# Patient Record
Sex: FEMALE | Race: WHITE | NOT HISPANIC OR LATINO | Employment: OTHER | URBAN - METROPOLITAN AREA
[De-identification: names, ages, dates, MRNs, and addresses within clinical notes are randomized per-mention and may not be internally consistent; named-entity substitution may affect disease eponyms.]

---

## 2017-08-25 ENCOUNTER — OFFICE VISIT (OUTPATIENT)
Dept: OBSTETRICS AND GYNECOLOGY | Age: 57
End: 2017-08-25

## 2017-08-25 VITALS
BODY MASS INDEX: 28.92 KG/M2 | DIASTOLIC BLOOD PRESSURE: 76 MMHG | SYSTOLIC BLOOD PRESSURE: 116 MMHG | HEIGHT: 63 IN | WEIGHT: 163.2 LBS

## 2017-08-25 DIAGNOSIS — Z13.9 SCREENING: Primary | ICD-10-CM

## 2017-08-25 DIAGNOSIS — N89.8 VAGINAL LESION: ICD-10-CM

## 2017-08-25 DIAGNOSIS — B37.2 CUTANEOUS CANDIDIASIS: ICD-10-CM

## 2017-08-25 LAB
BILIRUB BLD-MCNC: ABNORMAL MG/DL
CLARITY, POC: CLEAR
COLOR UR: YELLOW
GLUCOSE UR STRIP-MCNC: NEGATIVE MG/DL
KETONES UR QL: ABNORMAL
LEUKOCYTE EST, POC: ABNORMAL
NITRITE UR-MCNC: NEGATIVE MG/ML
PH UR: 5.5 [PH] (ref 5–8)
PROT UR STRIP-MCNC: NEGATIVE MG/DL
RBC # UR STRIP: ABNORMAL /UL
SP GR UR: 1.03 (ref 1–1.03)
UROBILINOGEN UR QL: NORMAL

## 2017-08-25 PROCEDURE — 99213 OFFICE O/P EST LOW 20 MIN: CPT | Performed by: PHYSICIAN ASSISTANT

## 2017-08-25 PROCEDURE — 81002 URINALYSIS NONAUTO W/O SCOPE: CPT | Performed by: PHYSICIAN ASSISTANT

## 2017-08-25 RX ORDER — ASCORBIC ACID 1000 MG
TABLET ORAL
COMMUNITY
End: 2020-12-28

## 2017-08-25 RX ORDER — CLOTRIMAZOLE AND BETAMETHASONE DIPROPIONATE 10; .64 MG/G; MG/G
CREAM TOPICAL 2 TIMES DAILY
Qty: 1 EACH | Refills: 1 | Status: SHIPPED | OUTPATIENT
Start: 2017-08-25 | End: 2017-09-19

## 2017-08-25 RX ORDER — MULTIPLE VITAMINS W/ MINERALS TAB 9MG-400MCG
1 TAB ORAL DAILY
COMMUNITY

## 2017-08-25 RX ORDER — CALCIUM CARBONATE 200(500)MG
1 TABLET,CHEWABLE ORAL 2 TIMES DAILY
COMMUNITY

## 2017-08-25 RX ORDER — WARFARIN SODIUM 5 MG/1
TABLET ORAL
COMMUNITY
Start: 2017-06-21 | End: 2017-09-19

## 2017-08-25 RX ORDER — TRAZODONE HYDROCHLORIDE 50 MG/1
TABLET ORAL
COMMUNITY
Start: 2017-08-04 | End: 2020-12-28

## 2017-08-25 RX ORDER — ASCORBIC ACID 500 MG
500 TABLET ORAL DAILY
COMMUNITY
End: 2018-09-21

## 2017-08-25 RX ORDER — NICOTINE 14MG/24HR
PATCH, TRANSDERMAL 24 HOURS TRANSDERMAL
COMMUNITY

## 2017-08-25 RX ORDER — BIOTIN 1000 MCG
5000 TABLET,CHEWABLE ORAL
COMMUNITY
End: 2020-03-03

## 2017-08-25 RX ORDER — TAMSULOSIN HYDROCHLORIDE 0.4 MG/1
1 CAPSULE ORAL NIGHTLY
COMMUNITY
End: 2018-09-21

## 2017-08-25 RX ORDER — FUROSEMIDE 20 MG/1
20 TABLET ORAL DAILY
COMMUNITY
End: 2020-12-28

## 2017-08-25 RX ORDER — WARFARIN SODIUM 2.5 MG/1
TABLET ORAL
COMMUNITY
Start: 2017-08-02 | End: 2018-09-21

## 2017-08-25 RX ORDER — CHLORAL HYDRATE 500 MG
CAPSULE ORAL 2 TIMES DAILY WITH MEALS
COMMUNITY
End: 2022-03-14 | Stop reason: SDUPTHER

## 2017-08-25 NOTE — PROGRESS NOTES
"Subjective     Chief Complaint   Patient presents with   • Follow-up     Pt states vaginal bumps- thinks she has a possible yeast infection? denies vaginal discharge. Pt states has irritation in the left side of groin that comes and goes for the past 3-4 weeks.        Nevin Marei is a 57 y.o. No obstetric history on file. whose LMP is No LMP recorded. Patient has had a hysterectomy. presents with possible yeast infection on the left side of her groin and also a bump on her labia majora. She has a h/o stage 4 endometriosis and had Hyst b/c of that.     Pt had an aortic valve replacement on June 16th (had a bicuspid valve), she is in cardiac rehab now and has been more active because of this.  It was a rough recovery for her especially the first 4 weeks.  She had some lung issues that have since resolved.       She is also scheduled for lithotripsy but they are waiting for her to fully recover from the heart surgery    She is pretty complex medically. Diabetes, h/o endometriosis, hypothyroid, asthma, allergies.  BS are relatively stable.       for 22 years    She denies risk of STD exposure.        No Additional Complaints Reported    The following portions of the patient's history were reviewed and updated as appropriate:vital signs, allergies, current medications, past family history, past medical history, past social history, past surgical history and problem list      Review of Systems   A comprehensive review of systems was negative except for: Integument/breast: positive for pruritus and skin lesion(s)  Labial bump on the left side     Objective      /76  Ht 62.75\" (159.4 cm)  Wt 163 lb 3.2 oz (74 kg)  Breastfeeding? No  BMI 29.14 kg/m2    Physical Exam    General:   alert and comfortable   Heart: Not performed today   Lungs: Not performed today.   Breast: Not performed today   Neck: na   Abdomen: {Not performed today   CVA: Not performed today   Pelvis: External genitalia: erythema in " inguinal canal on the right side  Vaginal: see pic, atrophic changes noted vaginally   Extremities: Not performed today   Neurologic: negative   Psychiatric: Normal affect, judgement, and mood       Lab Review   Labs: Urinalysis - with micro     Imaging   No data reviewed    Assessment/Plan     ASSESSMENT  1. Screening    2. Cutaneous candidiasis    3. Vaginal lesion        PLAN  1.   Orders Placed This Encounter   Procedures   • POC Urinalysis Dipstick       2. Medications prescribed this encounter:        New Medications Ordered This Visit   Medications   • warfarin (COUMADIN) 2.5 MG tablet   • warfarin (COUMADIN) 5 MG tablet   • traZODone (DESYREL) 50 MG tablet   • furosemide (LASIX) 20 MG tablet     Sig: Take 20 mg by mouth Daily.   • vitamin C (ASCORBIC ACID) 500 MG tablet     Sig: Take 500 mg by mouth Daily.   • Multiple Vitamins-Minerals (MULTIVITAMIN WITH MINERALS) tablet tablet     Sig: Take 1 tablet by mouth Daily.   • calcium carbonate (TUMS) 500 MG chewable tablet     Sig: Chew 1 tablet 2 (Two) Times a Day.   • Biotin 1000 MCG chewable tablet     Sig: Chew 5,000 mg.   • Omega-3 Fatty Acids (FISH OIL) 1000 MG capsule capsule     Sig: Take  by mouth 2 (Two) Times a Day With Meals.   • Saccharomyces boulardii (PROBIOTIC) 250 MG capsule     Sig: Take  by mouth.   • Jovita, Zingiber officinalis, (JOVITA ROOT) 500 MG capsule     Sig: Take  by mouth.   • vitamin E 100 UNIT capsule     Sig: Take 100 Units by mouth Daily.   • tamsulosin (FLOMAX) 0.4 MG capsule 24 hr capsule     Sig: Take 1 capsule by mouth Every Night.   • Mirabegron ER (MYRBETRIQ) 50 MG tablet sustained-release 24 hour 24 hr tablet     Sig: Take 50 mg by mouth Daily.   • clotrimazole-betamethasone (LOTRISONE) 1-0.05 % cream     Sig: Apply  topically 2 (Two) Times a Day.     Dispense:  1 each     Refill:  1       3. R/o herpes.  Discussed possibility and encouraged to avoid IC for now.  However she has been with the same partner for 22 years so   Likely that if this is herpes, he has already been exposed.     Follow up: 4 week(s) for annual    REINA Coronel  8/25/2017

## 2017-09-01 LAB
A VAGINAE DNA VAG QL NAA+PROBE: ABNORMAL SCORE
BVAB2 DNA VAG QL NAA+PROBE: ABNORMAL SCORE
C ALBICANS DNA VAG QL NAA+PROBE: POSITIVE
C GLABRATA DNA VAG QL NAA+PROBE: NEGATIVE
HSV1 DNA SPEC QL NAA+PROBE: NEGATIVE
HSV2 DNA SPEC QL NAA+PROBE: NEGATIVE
MEGA1 DNA VAG QL NAA+PROBE: ABNORMAL SCORE
T VAGINALIS RRNA SPEC QL NAA+PROBE: NEGATIVE

## 2017-09-01 RX ORDER — FLUCONAZOLE 150 MG/1
150 TABLET ORAL ONCE
Qty: 2 TABLET | Refills: 0 | Status: SHIPPED | OUTPATIENT
Start: 2017-09-01 | End: 2017-09-01

## 2017-09-07 ENCOUNTER — TELEPHONE (OUTPATIENT)
Dept: OBSTETRICS AND GYNECOLOGY | Age: 57
End: 2017-09-07

## 2017-09-07 NOTE — TELEPHONE ENCOUNTER
Dr MILDRED benavidez states she was recently seen for external yeast inf and Rx'd an ointment. Woke up this a.m with blood in the toilet and mild cramping low abd, no burning, no urg, no freq, no low back pain. Please advise

## 2017-09-08 ENCOUNTER — OFFICE VISIT (OUTPATIENT)
Dept: OBSTETRICS AND GYNECOLOGY | Age: 57
End: 2017-09-08

## 2017-09-08 VITALS
WEIGHT: 161 LBS | SYSTOLIC BLOOD PRESSURE: 110 MMHG | HEIGHT: 63 IN | BODY MASS INDEX: 28.53 KG/M2 | DIASTOLIC BLOOD PRESSURE: 72 MMHG

## 2017-09-08 DIAGNOSIS — B37.9 CANDIDIASIS: ICD-10-CM

## 2017-09-08 DIAGNOSIS — R31.9 BLOOD IN URINE: Primary | ICD-10-CM

## 2017-09-08 DIAGNOSIS — N76.1 SUBACUTE VAGINITIS: ICD-10-CM

## 2017-09-08 LAB
BILIRUB BLD-MCNC: NEGATIVE MG/DL
CLARITY, POC: CLEAR
COLOR UR: YELLOW
GLUCOSE UR STRIP-MCNC: NEGATIVE MG/DL
KETONES UR QL: NEGATIVE
LEUKOCYTE EST, POC: ABNORMAL
NITRITE UR-MCNC: NEGATIVE MG/ML
PH UR: 5.5 [PH] (ref 5–8)
PROT UR STRIP-MCNC: NEGATIVE MG/DL
RBC # UR STRIP: ABNORMAL /UL
SP GR UR: 1.01 (ref 1–1.03)
UROBILINOGEN UR QL: NORMAL

## 2017-09-08 PROCEDURE — 99213 OFFICE O/P EST LOW 20 MIN: CPT | Performed by: NURSE PRACTITIONER

## 2017-09-08 PROCEDURE — 81002 URINALYSIS NONAUTO W/O SCOPE: CPT | Performed by: NURSE PRACTITIONER

## 2017-09-08 RX ORDER — OXYBUTYNIN CHLORIDE 10 MG/1
TABLET, EXTENDED RELEASE ORAL
COMMUNITY
Start: 2017-08-31 | End: 2018-09-21

## 2017-09-08 RX ORDER — SIMVASTATIN 40 MG
TABLET ORAL
COMMUNITY
Start: 2017-08-28

## 2017-09-08 NOTE — PROGRESS NOTES
"Subjective   Nevin Marie is a 57 y.o. female is being seen today for   Chief Complaint   Patient presents with   • Vaginal Irritation     Pt c/o blood in urine, lower abdominal cramping, burning on labia and slight odor. Denies burning with urination, discharge or itching.   .    History of Present Illness     Patient was last seen in our office last week by the PA with some external vaginal irritation and red bumps. She is a new patient to me. She thought it was a yeast infection and was given lotrisone cream.  She used the cream, which helped.  At the same time a vaginal culture was obtained and she was + for yeast.  She thought something would be called in but she never received confirmation from the pharmacy that anything was ready for her.  She hasn't had itching but does have some vaginal burning.  The external rash has cleared up.  She also noticed some blood in her urine yesterday.  Of note, she does currently have kidney stones.  [facility] and they had considered lithotripsy but decided against it since she was awaiting heart surgery when they were found.  SHe denies any back pain, flank pain or other urinary symptoms.  She did see urology last week but they never mentioned if she had blood in her urine or not.    The following portions of the patient's history were reviewed and updated as appropriate: allergies, current medications, past family history, past medical history, past social history, past surgical history and problem list.    /72  Ht 62.75\" (159.4 cm)  Wt 161 lb (73 kg)  BMI 28.75 kg/m2      Review of Systems   Constitutional: Negative.    HENT: Negative.    Eyes: Negative.    Respiratory: Negative.    Cardiovascular: Negative.    Gastrointestinal: Negative.    Endocrine: Negative.    Genitourinary: Positive for vaginal pain. Negative for difficulty urinating, dysuria and genital sores.   Musculoskeletal: Negative.    Skin: Negative.    Allergic/Immunologic: Negative.  "   Neurological: Negative.    Hematological: Negative.    Psychiatric/Behavioral: Negative.        Objective   Physical Exam   Constitutional: She is oriented to person, place, and time. She appears well-developed and well-nourished.   Abdominal: Soft.   Genitourinary: Vagina normal.   Genitourinary Comments: Scant white discharge noted. No lesions or rashes noted.  There are a few red bumps on her bikini line noted.      Neurological: She is alert and oriented to person, place, and time.   Skin: Skin is warm and dry.   Psychiatric: She has a normal mood and affect.         Assessment/Plan   Nevin was seen today for vaginal irritation.    Diagnoses and all orders for this visit:    Blood in urine  -     POC Urinalysis Dipstick    Subacute vaginitis    Candidiasis    Other orders  -     terconazole (TERAZOL 7) 0.4 % vaginal cream; Insert 1 applicator into the vagina Every Night.      She will continue lotrisone externally as needed and start Terazol 7 cream vaginally x 7 days.  Patient also plans follow up with urology regarding blood in urine.

## 2017-09-19 ENCOUNTER — OFFICE VISIT (OUTPATIENT)
Dept: OBSTETRICS AND GYNECOLOGY | Age: 57
End: 2017-09-19

## 2017-09-19 VITALS
SYSTOLIC BLOOD PRESSURE: 138 MMHG | WEIGHT: 163 LBS | DIASTOLIC BLOOD PRESSURE: 90 MMHG | BODY MASS INDEX: 28.88 KG/M2 | HEIGHT: 63 IN

## 2017-09-19 DIAGNOSIS — Z01.419 WELL WOMAN EXAM WITH ROUTINE GYNECOLOGICAL EXAM: Primary | ICD-10-CM

## 2017-09-19 DIAGNOSIS — R23.2 HOT FLASHES: ICD-10-CM

## 2017-09-19 LAB
BILIRUB BLD-MCNC: NEGATIVE MG/DL
CLARITY, POC: CLEAR
COLOR UR: YELLOW
GLUCOSE UR STRIP-MCNC: NEGATIVE MG/DL
KETONES UR QL: NEGATIVE
LEUKOCYTE EST, POC: NEGATIVE
NITRITE UR-MCNC: NEGATIVE MG/ML
PH UR: 5.5 [PH] (ref 5–8)
PROT UR STRIP-MCNC: NEGATIVE MG/DL
RBC # UR STRIP: ABNORMAL /UL
SP GR UR: 1 (ref 1–1.03)
UROBILINOGEN UR QL: NORMAL

## 2017-09-19 PROCEDURE — 81002 URINALYSIS NONAUTO W/O SCOPE: CPT | Performed by: PHYSICIAN ASSISTANT

## 2017-09-19 PROCEDURE — 99396 PREV VISIT EST AGE 40-64: CPT | Performed by: PHYSICIAN ASSISTANT

## 2017-09-19 NOTE — PROGRESS NOTES
Subjective     Chief Complaint   Patient presents with   • Gynecologic Exam     annual exam., wants to talk about restarting with hrt patch       History of Present Illness    Nevin Marie is a 57 y.o.  who presents for annual exam.      She fell yesterday and had to be seen at the ER.  She is on coumadin and is now bruised on her left breast and right knee. She is therapeutic with her levels, 2.5 yesterday.  She is on it now since she had a surgery to repair a  bicuspid valve.      She is noting occasional hot flashes.  She came off her patch earlier this year when she was told she had a possible PE.  Apparently that was r/o. However, she is diabetic, has hypothyroidism and is on coumadin for recent blood clot.  She did have recent labs done and is f/u with her PCP next week. She has checked her sugars when the hot flashes occur and they have been wnl.    She saw me a few months ago for a yeast infection.  No problems since    She has tested negative for the BRCA gene.        Obstetric History:  OB History      Para Term  AB Living    0 0 0 0 0 0    SAB TAB Ectopic Multiple Live Births    0 0 0 0          Menstrual History:     No LMP recorded. Patient has had a hysterectomy.         Current contraception: status post hysterectomy  History of abnormal Pap smear: no  Received Gardasil immunization: no  Perform regular self breast exam: yes - occl  Family history of uterine or ovarian cancer: no  Family History of colon cancer: yes - PGF, maternal uncle with b cell lymphoma  Family history of breast cancer: yes - sister with Inflammatory breast cancer    Mammogram: done today.  Colonoscopy: up to date. Due in 2018  DEXA: not indicated.    Exercise: moderately active  Calcium/Vitamin D: adequate intake    The following portions of the patient's history were reviewed and updated as appropriate: allergies, current medications, past family history, past medical history, past social history, past  "surgical history and problem list.    Review of Systems   Endocrine: Positive for heat intolerance.   Skin:        Bruising     Hematological: Bruises/bleeds easily.   All other systems reviewed and are negative.      Review of Systems   Constitutional: Negative for fatigue.   Respiratory: Negative for shortness of breath.    Gastrointestinal: Negative for abdominal pain.   Genitourinary: Negative for dysuria.   Neurological: Negative for headaches.   Psychiatric/Behavioral: Negative for dysphoric mood.         Objective   Physical Exam    /90  Ht 63\" (160 cm)  Wt 163 lb (73.9 kg)  Breastfeeding? No  BMI 28.87 kg/m2    General:   alert, appears stated age and cooperative   Neck: no adenopathy and thyroid normal to palpation   Heart: regular rate and rhythm   Lungs: clear to auscultation bilaterally   Abdomen: soft and nontender   Breast: inspection negative, no nipple discharge or bleeding, no masses or nodularity palpable and small amount of bruising noted on left breast   Vulva: normal   Vagina: atrophic changes   Cervix: absent   Uterus: absent   Adnexa: absent   Rectal: normal rectal, no masses and guaiac negative stool obtained     Assessment/Plan   Nevin was seen today for gynecologic exam.    Diagnoses and all orders for this visit:    Well woman exam with routine gynecological exam  -     POC Urinalysis Dipstick, Multipro    Hot flashes        All questions answered.  Breast self exam technique reviewed and patient encouraged to perform self-exam monthly.  Discussed healthy lifestyle modifications.  Recommended 30 minutes of aerobic exercise five times per week.  Discussed calcium needs to prevent osteoporosis.    Disc pap guidelines, no longer needed  Mammo today  Disc HRT, not recommended in light of her coumadin use, recent heart surgery, length of time since MP.  Disc alternate options, including Br[facility]elle and advised pt that I would be happy to seek a second opinion on this with Dr Kc as " well. In meantime, will await results of her thyroid test

## 2017-11-13 ENCOUNTER — OFFICE VISIT (OUTPATIENT)
Dept: OBSTETRICS AND GYNECOLOGY | Age: 57
End: 2017-11-13

## 2017-11-13 VITALS
HEIGHT: 64 IN | SYSTOLIC BLOOD PRESSURE: 110 MMHG | WEIGHT: 161 LBS | DIASTOLIC BLOOD PRESSURE: 70 MMHG | BODY MASS INDEX: 27.49 KG/M2

## 2017-11-13 DIAGNOSIS — N76.3 SUBACUTE VULVITIS: ICD-10-CM

## 2017-11-13 DIAGNOSIS — L73.2 HIDRADENITIS SUPPURATIVA: Primary | ICD-10-CM

## 2017-11-13 PROCEDURE — 99213 OFFICE O/P EST LOW 20 MIN: CPT | Performed by: OBSTETRICS & GYNECOLOGY

## 2017-11-13 NOTE — PROGRESS NOTES
"GYN Visit    2017    Patient: Nevin Marie          MR#:[id number]      Chief Complaint   Patient presents with   • Gynecologic Exam     PT HERE FOR OUTBREAK OF \"BUMPS\" ON LABIA.       History of Present Illness    57 y.o. female  who presents for bumps on labia  Healing right now but 2 recent \"outbreaks\" of bumps that are firm and hurt like a pimple,   She has also had in axilla and on breasts  Just started after she started working out more on her cardiac rehab  She is on warfarin  She takes a lot of medication  No recurrent sores like a herpes picture  hasnt been sexually active since surgery (5 months)  No itching or discharge  Treated for yeast vaginitis recently         No LMP recorded. Patient has had a hysterectomy.    ________________________________________  Patient Active Problem List   Diagnosis   • Family history of breast cancer in sister       Past Medical History:   Diagnosis Date   • Acute colitis    • Allergic rhinitis    • Anemia    • Asthma    • Depression    • Diabetes mellitus    • Endometriosis    • Esophageal reflux    • Fibroids    • GERD (gastroesophageal reflux disease)    • H/O dysthymia    • History of IBS    • Hyperlipidemia    • Hypertension    • Infertility, female    • Kidney stones    • Menopause    • Osteopenia    • Pelvic pain    • PTSD (post-traumatic stress disorder)        Past Surgical History:   Procedure Laterality Date   • CHOLECYSTECTOMY     • COLONOSCOPY     • HYSTERECTOMY     • LAPAROTOMY SALPINGO OOPHORECTOMY     • MOUTH SURGERY     • MYOMECTOMY     • TONSILLECTOMY         History   Smoking Status   • Never Smoker   Smokeless Tobacco   • Never Used       has a current medication list which includes the following prescription(s): albuterol, biotin, calcium carbonate, carvedilol, cetirizine, dulera, epinephrine, escitalopram, ferrous sulfate, fluticasone, furosemide, iliana root, loperamide, meclizine, metformin, montelukast, multivitamin with minerals, " "olopatadine, fish oil, omeprazole, oxybutynin xl, promethazine, pseudoephedrine-guaifenesin, probiotic, simethicone, simvastatin, synthroid, tamsulosin, trazodone, vitamin c, vitamin e, and warfarin.  ________________________________________    Current contraception: status post hysterectomy      The following portions of the patient's history were reviewed and updated as appropriate: allergies, current medications, past family history, past medical history, past social history, past surgical history and problem list.    Review of Systems   Constitutional: Negative for chills and fever.   Gastrointestinal: Negative for nausea and vomiting.   Genitourinary: Negative for dysuria, pelvic pain, vaginal bleeding and vaginal discharge.            Objective   Physical Exam    /70  Ht 64\" (162.6 cm)  Wt 161 lb (73 kg)  BMI 27.64 kg/m2   BP Readings from Last 3 Encounters:   11/13/17 110/70   09/19/17 138/90   09/08/17 110/72      Wt Readings from Last 3 Encounters:   11/13/17 161 lb (73 kg)   09/19/17 163 lb (73.9 kg)   09/08/17 161 lb (73 kg)      BMI: Estimated body mass index is 27.64 kg/(m^2) as calculated from the following:    Height as of this encounter: 64\" (162.6 cm).    Weight as of this encounter: 161 lb (73 kg).    /70  Ht 64\" (162.6 cm)  Wt 161 lb (73 kg)  BMI 27.64 kg/m2  General:   alert, appears stated age and cooperative   Abdomen: soft, non-tender, without masses or organomegaly   Breast: inspection negative, no nipple discharge or bleeding, no masses or nodularity palpable   Vulva: normal, some evidence of healed area , likely hidradenitis suppurtiva, no evidence ulcers or lesions    Vagina: normal mucosa   Cervix: absent   Uterus: absent    Adnexa: Deferred     Assessment:      Nevin was seen today for gynecologic exam.    Diagnoses and all orders for this visit:    Hidradenitis suppurativa    Subacute vulvitis  -     HSV 1 & 2 - Specific Antibody, IgG      I suspect she has very mild " hidradenitis , likely from new work out habits  She has already started being more careful to change out of sweaty clothing etc  HO was given  I would avoid meds as very mild- no active lesions today and pt is on warfarin  Will check HSV blood work and if neg can r/o HSV as a genital concern  Less helpful if positive but pt can re- present if has an active area so we can swab for HSV if appropriate

## 2017-11-14 ENCOUNTER — TELEPHONE (OUTPATIENT)
Dept: OBSTETRICS AND GYNECOLOGY | Age: 57
End: 2017-11-14

## 2017-11-14 LAB
HSV1 IGG SER IA-ACNC: 9.71 INDEX (ref 0–0.9)
HSV2 IGG SER IA-ACNC: <0.91 INDEX (ref 0–0.9)

## 2017-11-14 NOTE — TELEPHONE ENCOUNTER
Notified of positive result, unable to diagnose genital lesion from this lab test , pt also remembered she might have had a cold sore in past.  I rec that if pt has a vulvar lesion she is worried about to come in for PCR swab.  Again her history doesn't really fit a herpes diagnosis and I am not highly suspicious this is an issue for her

## 2018-06-05 ENCOUNTER — HOSPITAL ENCOUNTER (OUTPATIENT)
Dept: OTHER | Facility: HOSPITAL | Age: 58
Setting detail: SPECIMEN
Discharge: HOME | End: 2018-06-05
Attending: UROLOGY | Admitting: UROLOGY

## 2018-06-06 LAB — SPECIMEN SOURCE: NORMAL

## 2018-09-21 ENCOUNTER — APPOINTMENT (OUTPATIENT)
Dept: WOMENS IMAGING | Facility: HOSPITAL | Age: 58
End: 2018-09-21

## 2018-09-21 ENCOUNTER — OFFICE VISIT (OUTPATIENT)
Dept: OBSTETRICS AND GYNECOLOGY | Age: 58
End: 2018-09-21

## 2018-09-21 VITALS
DIASTOLIC BLOOD PRESSURE: 70 MMHG | WEIGHT: 157 LBS | SYSTOLIC BLOOD PRESSURE: 100 MMHG | BODY MASS INDEX: 26.8 KG/M2 | HEIGHT: 64 IN

## 2018-09-21 DIAGNOSIS — Z01.419 ENCOUNTER FOR GYNECOLOGICAL EXAMINATION WITHOUT ABNORMAL FINDING: Primary | ICD-10-CM

## 2018-09-21 PROCEDURE — 99396 PREV VISIT EST AGE 40-64: CPT | Performed by: OBSTETRICS & GYNECOLOGY

## 2018-09-21 PROCEDURE — 77067 SCR MAMMO BI INCL CAD: CPT | Performed by: RADIOLOGY

## 2018-09-21 PROCEDURE — 77063 BREAST TOMOSYNTHESIS BI: CPT | Performed by: RADIOLOGY

## 2018-09-21 NOTE — PROGRESS NOTES
Routine Annual Visit    2018    Patient: Nevin Marie          MR#:[id number]      Chief Complaint   Patient presents with   • Annual Exam     PT HERE FOR ROUTINE AE, JUST CAME FROM [facility] FOR MG. SHE IS NOT FEELING VERY WELL, A LITTLE DIZZY. LAST PAP  (NEG). LAST C-SCOPE .       History of Present Illness    58 y.o. female  who presents for annual exam.   Feeling tired and dizzy today  Working out a lot and vulvar lesions went away  No GYN concerns  Having HF and NS and discussed black cohosh  Will ask her cardiologist about this  Do not rec taking estrogen  Went on a fantastic [place] trip with daughter and her choir for 3 weeks  mammo done today  UTD CSC   Hyst, no pap indicated          No LMP recorded. Patient has had a hysterectomy.  Obstetric History:  OB History      Para Term  AB Living    0 0 0 0 0 0    SAB TAB Ectopic Molar Multiple Live Births    0 0 0   0           Menstrual History:     No LMP recorded. Patient has had a hysterectomy.       Sexual History:       ________________________________________  Patient Active Problem List   Diagnosis   • Family history of breast cancer in sister       Past Medical History:   Diagnosis Date   • Acute colitis    • Allergic rhinitis    • Anemia    • Asthma    • Depression    • Diabetes mellitus (CMS/HCC)    • Endometriosis    • Esophageal reflux    • Fibroids    • GERD (gastroesophageal reflux disease)    • H/O dysthymia    • History of IBS    • Hyperlipidemia    • Hypertension    • Infertility, female    • Kidney stones    • Menopause    • Osteopenia    • Pelvic pain    • PTSD (post-traumatic stress disorder)        Past Surgical History:   Procedure Laterality Date   • CHOLECYSTECTOMY     • COLONOSCOPY     • HYSTERECTOMY     • LAPAROTOMY SALPINGO OOPHORECTOMY     • MOUTH SURGERY     • MYOMECTOMY     • TONSILLECTOMY         History   Smoking Status   • Never Smoker   Smokeless Tobacco   • Never Used       has a current  "medication list which includes the following prescription(s): albuterol, biotin, calcium carbonate, carvedilol, cetirizine, dulera, epinephrine, escitalopram, ferrous sulfate, fluticasone, meclizine, metformin, montelukast, multivitamin with minerals, olopatadine, fish oil, omeprazole, promethazine, pseudoephedrine-guaifenesin, probiotic, simethicone, simvastatin, synthroid, trazodone, furosemide, iliana root, loperamide, oxybutynin xl, tamsulosin, vitamin c, vitamin e, and warfarin.  ________________________________________    Current contraception: status post hysterectomy  History of abnormal Pap smear: no  Family history of Breast cancer: sister age 45  Family history of uterine or ovarian cancer: no  Family History of colon cancer/colon polyps: yes - GF  History of abnormal mammogram: no      The following portions of the patient's history were reviewed and updated as appropriate: allergies, current medications, past family history, past medical history, past social history, past surgical history and problem list.    Review of Systems    Pertinent items are noted in HPI.     Objective   Physical Exam    /70   Ht 162.6 cm (64\")   Wt 71.2 kg (157 lb)   BMI 26.95 kg/m²    BP Readings from Last 3 Encounters:   09/21/18 100/70   11/13/17 110/70   09/19/17 138/90      Wt Readings from Last 3 Encounters:   09/21/18 71.2 kg (157 lb)   11/13/17 73 kg (161 lb)   09/19/17 73.9 kg (163 lb)      BMI: Estimated body mass index is 26.95 kg/m² as calculated from the following:    Height as of this encounter: 162.6 cm (64\").    Weight as of this encounter: 71.2 kg (157 lb).      General:   alert, appears stated age and cooperative   Abdomen: soft, non-tender, without masses or organomegaly   Breast: inspection negative, no nipple discharge or bleeding, no masses or nodularity palpable   Vulva: normal   Vagina: normal mucosa   Cervix: absent   Uterus: absent    Adnexa: no mass, fullness, tenderness     Assessment:    1. " Normal annual exam   Assessment     ICD-10-CM ICD-9-CM   1. Encounter for gynecological examination without abnormal finding Z01.419 V72.31     Plan:    Plan     [x]  Mammogram request made  []  PAP done  []  Labs:   []  GC/Chl/TV  []  DEXA scan   []  Referral for colonoscopy:       Nevin was seen today for annual exam.    Diagnoses and all orders for this visit:    Encounter for gynecological examination without abnormal finding            Counseling:  --Nutrition: Stressed importance of moderation and caloric balance, stressed fresh fruit and vegetables  --Exercise: Stressed the importance of regular exercise. 3-5 times weekly   - Discussed screening mammogram recommendations.   --Discussed benefits of screening colonoscopy- age 45 unless FH  --Discussed pap smear screening recommendations

## 2020-03-03 ENCOUNTER — OFFICE VISIT (OUTPATIENT)
Dept: OBSTETRICS AND GYNECOLOGY | Age: 60
End: 2020-03-03

## 2020-03-03 ENCOUNTER — PROCEDURE VISIT (OUTPATIENT)
Dept: OBSTETRICS AND GYNECOLOGY | Age: 60
End: 2020-03-03

## 2020-03-03 ENCOUNTER — APPOINTMENT (OUTPATIENT)
Dept: WOMENS IMAGING | Facility: HOSPITAL | Age: 60
End: 2020-03-03

## 2020-03-03 VITALS
BODY MASS INDEX: 30.05 KG/M2 | WEIGHT: 176 LBS | HEIGHT: 64 IN | DIASTOLIC BLOOD PRESSURE: 60 MMHG | SYSTOLIC BLOOD PRESSURE: 102 MMHG

## 2020-03-03 DIAGNOSIS — Z01.419 ENCOUNTER FOR GYNECOLOGICAL EXAMINATION (GENERAL) (ROUTINE) WITHOUT ABNORMAL FINDINGS: Primary | ICD-10-CM

## 2020-03-03 DIAGNOSIS — Z13.820 SCREENING FOR OSTEOPOROSIS: ICD-10-CM

## 2020-03-03 DIAGNOSIS — M85.80 OSTEOPENIA, SENILE: ICD-10-CM

## 2020-03-03 DIAGNOSIS — Z12.31 VISIT FOR SCREENING MAMMOGRAM: Primary | ICD-10-CM

## 2020-03-03 PROBLEM — I44.7 LBBB (LEFT BUNDLE BRANCH BLOCK): Status: ACTIVE | Noted: 2017-06-18

## 2020-03-03 PROBLEM — Q24.5: Status: ACTIVE | Noted: 2019-01-03

## 2020-03-03 PROBLEM — E78.5 HYPERLIPEMIA: Status: ACTIVE | Noted: 2020-03-03

## 2020-03-03 PROBLEM — Z29.8 NEED FOR SBE (SUBACUTE BACTERIAL ENDOCARDITIS) PROPHYLAXIS: Status: ACTIVE | Noted: 2018-02-13

## 2020-03-03 PROBLEM — Z86.711 HISTORY OF PULMONARY EMBOLUS (PE): Status: ACTIVE | Noted: 2018-02-13

## 2020-03-03 PROBLEM — Z29.89 NEED FOR SBE (SUBACUTE BACTERIAL ENDOCARDITIS) PROPHYLAXIS: Status: ACTIVE | Noted: 2018-02-13

## 2020-03-03 PROBLEM — Z95.2 STATUS POST AORTIC VALVE REPLACEMENT WITH PROSTHETIC VALVE: Status: ACTIVE | Noted: 2017-06-18

## 2020-03-03 PROBLEM — I10 HYPERTENSION: Status: ACTIVE | Noted: 2020-03-03

## 2020-03-03 PROBLEM — I50.33 DIASTOLIC CHF, ACUTE ON CHRONIC: Status: ACTIVE | Noted: 2017-06-20

## 2020-03-03 PROBLEM — Z12.11 SPECIAL SCREENING FOR MALIGNANT NEOPLASM OF COLON: Status: ACTIVE | Noted: 2018-02-08

## 2020-03-03 PROBLEM — R07.9 CHEST PAIN: Status: ACTIVE | Noted: 2018-12-23

## 2020-03-03 PROBLEM — R06.02 SHORTNESS OF BREATH: Status: ACTIVE | Noted: 2018-12-23

## 2020-03-03 PROCEDURE — 99396 PREV VISIT EST AGE 40-64: CPT | Performed by: OBSTETRICS & GYNECOLOGY

## 2020-03-03 PROCEDURE — 77067 SCR MAMMO BI INCL CAD: CPT | Performed by: RADIOLOGY

## 2020-03-03 PROCEDURE — 77067 SCR MAMMO BI INCL CAD: CPT | Performed by: OBSTETRICS & GYNECOLOGY

## 2020-03-03 RX ORDER — LISINOPRIL 2.5 MG/1
TABLET ORAL
COMMUNITY
Start: 2020-01-16

## 2020-03-03 RX ORDER — RANOLAZINE 500 MG/1
TABLET, EXTENDED RELEASE ORAL
COMMUNITY
Start: 2020-02-13

## 2020-03-03 RX ORDER — METHYLPREDNISOLONE 4 MG/1
TABLET ORAL
COMMUNITY
Start: 2020-01-10 | End: 2020-12-28

## 2020-03-03 RX ORDER — MIRABEGRON 50 MG/1
TABLET, FILM COATED, EXTENDED RELEASE ORAL
COMMUNITY
Start: 2020-01-18 | End: 2020-12-28

## 2020-03-03 RX ORDER — BUDESONIDE AND FORMOTEROL FUMARATE DIHYDRATE 160; 4.5 UG/1; UG/1
AEROSOL RESPIRATORY (INHALATION)
COMMUNITY
Start: 2020-02-14

## 2020-03-03 NOTE — PROGRESS NOTES
Routine Annual Visit    3/3/2020    Patient: Nevin Marie          MR#:[id number]      Chief Complaint   Patient presents with   • Gynecologic Exam     AE AND MG BEFORE EXAM TODAY.  HYST.  C-SCOPE 2017       History of Present Illness    59 y.o. female  who presents for annual exam.   Pt doing well, some depression in the fall  Family issues with foster son who is 19 and daughter who had a relationship  Pt keeping busy with [employer] dancing, will dance at [employer]  mammo today, pap not indicated- hyst  CSC 2018 UTD  History of osteopenia, will do dexa          No LMP recorded. Patient has had a hysterectomy.  Obstetric History:  OB History        0    Para   0    Term   0       0    AB   0    Living   0       SAB   0    TAB   0    Ectopic   0    Molar        Multiple   0    Live Births                   Menstrual History:     No LMP recorded. Patient has had a hysterectomy.       Sexual History:       ________________________________________  Patient Active Problem List   Diagnosis   • Family history of breast cancer in sister   • Aberrant circumflex origin from RCA   • Abnormal thyroid blood test   • Asthma   • Chest pain   • Depression   • Diabetes mellitus type 2, controlled (CMS/HCC)   • Diastolic CHF, acute on chronic (CMS/HCC)   • History of pulmonary embolus (PE)   • Hyperlipemia   • Iron deficiency anemia   • LBBB (left bundle branch block)   • Hypertension   • Need for SBE (subacute bacterial endocarditis) prophylaxis   • Nephrolithiasis   • Shortness of breath   • Special screening for malignant neoplasm of colon   • Status post aortic valve replacement with prosthetic valve   • Vitamin D deficiency       Past Medical History:   Diagnosis Date   • Acute colitis    • Allergic rhinitis    • Anemia    • Asthma    • Depression    • Diabetes mellitus (CMS/HCC)    • Endometriosis    • Esophageal reflux    • Fibroids    • GERD (gastroesophageal reflux disease)    • H/O dysthymia    •  "History of IBS    • Hyperlipidemia    • Hypertension    • Infertility, female    • Kidney stones    • Menopause    • Osteopenia    • Pelvic pain    • PTSD (post-traumatic stress disorder)        Past Surgical History:   Procedure Laterality Date   • CHOLECYSTECTOMY     • COLONOSCOPY  2017   • HYSTERECTOMY     • LAPAROTOMY SALPINGO OOPHORECTOMY     • MOUTH SURGERY     • MYOMECTOMY     • TONSILLECTOMY         Social History     Tobacco Use   Smoking Status Never Smoker   Smokeless Tobacco Never Used       has a current medication list which includes the following prescription(s): albuterol sulfate hfa, calcium carbonate, carvedilol, cetirizine, escitalopram, fluticasone, glucose blood, lisinopril, meclizine, metformin, montelukast, multivitamin with minerals, myrbetriq, olopatadine, fish oil, omeprazole, promethazine, pseudoephedrine-guaifenesin, probiotic, simethicone, simvastatin, symbicort, synthroid, trazodone, epinephrine, furosemide, iliana root, methylprednisolone, and ranolazine.  ________________________________________    Current contraception: status post hysterectomy  History of abnormal Pap smear: no  Family history of Breast cancer: no  Family history of uterine or ovarian cancer: no  Family History of colon cancer/colon polyps: no  History of abnormal mammogram: no      The following portions of the patient's history were reviewed and updated as appropriate: allergies, current medications, past family history, past medical history, past social history, past surgical history and problem list.    Review of Systems    Pertinent items are noted in HPI.     Objective   Physical Exam    /60   Ht 162.6 cm (64\")   Wt 79.8 kg (176 lb)   Breastfeeding No   BMI 30.21 kg/m²    BP Readings from Last 3 Encounters:   03/03/20 102/60   09/21/18 100/70   11/13/17 110/70      Wt Readings from Last 3 Encounters:   03/03/20 79.8 kg (176 lb)   09/21/18 71.2 kg (157 lb)   11/13/17 73 kg (161 lb)      BMI: Estimated " "body mass index is 30.21 kg/m² as calculated from the following:    Height as of this encounter: 162.6 cm (64\").    Weight as of this encounter: 79.8 kg (176 lb).      General:   alert, appears stated age and cooperative   Abdomen: soft, non-tender, without masses or organomegaly   Breast: inspection negative, no nipple discharge or bleeding, no masses or nodularity palpable   Vulva: normal   Vagina: normal mucosa   Cervix: absent   Uterus: absent    Adnexa: no mass, fullness, tenderness     Assessment:    1. Normal annual exam   Assessment     ICD-10-CM ICD-9-CM   1. Encounter for gynecological examination (general) (routine) without abnormal findings Z01.419 V72.31   2. Osteopenia, senile M85.80 733.90   3. Screening for osteoporosis Z13.820 V82.81     Plan:    Plan     [x]  Mammogram request made  []  PAP done  []  Labs:   []  GC/Chl/TV  [x]  DEXA scan   []  Referral for colonoscopy:       Nevin was seen today for gynecologic exam.    Diagnoses and all orders for this visit:    Encounter for gynecological examination (general) (routine) without abnormal findings    Osteopenia, senile  -     DEXA Bone Density Axial; Future    Screening for osteoporosis  -     DEXA Bone Density Axial; Future            Counseling:  --Nutrition: Stressed importance of moderation and caloric balance, stressed fresh fruit and vegetables  --Exercise: Stressed the importance of regular exercise. 3-5 times weekly   - Discussed screening mammogram recommendations.   --Discussed benefits of screening colonoscopy- age 45 unless FH  --Discussed pap smear screening recommendations  "

## 2020-07-15 ENCOUNTER — TELEPHONE (OUTPATIENT)
Dept: OBSTETRICS AND GYNECOLOGY | Age: 60
End: 2020-07-15

## 2020-10-21 ENCOUNTER — PROCEDURE VISIT (OUTPATIENT)
Dept: OBSTETRICS AND GYNECOLOGY | Age: 60
End: 2020-10-21

## 2020-10-21 DIAGNOSIS — Z78.0 POST-MENOPAUSAL: Primary | ICD-10-CM

## 2020-10-21 PROCEDURE — 77080 DXA BONE DENSITY AXIAL: CPT | Performed by: OBSTETRICS & GYNECOLOGY

## 2020-10-29 ENCOUNTER — TELEPHONE (OUTPATIENT)
Dept: OBSTETRICS AND GYNECOLOGY | Age: 60
End: 2020-10-29

## 2021-03-11 ENCOUNTER — OFFICE VISIT (OUTPATIENT)
Dept: OBSTETRICS AND GYNECOLOGY | Age: 61
End: 2021-03-11

## 2021-03-11 ENCOUNTER — APPOINTMENT (OUTPATIENT)
Dept: WOMENS IMAGING | Facility: HOSPITAL | Age: 61
End: 2021-03-11

## 2021-03-11 ENCOUNTER — PROCEDURE VISIT (OUTPATIENT)
Dept: OBSTETRICS AND GYNECOLOGY | Age: 61
End: 2021-03-11

## 2021-03-11 VITALS
WEIGHT: 176 LBS | HEIGHT: 63 IN | DIASTOLIC BLOOD PRESSURE: 82 MMHG | SYSTOLIC BLOOD PRESSURE: 132 MMHG | BODY MASS INDEX: 31.18 KG/M2

## 2021-03-11 DIAGNOSIS — M85.80 OSTEOPENIA, SENILE: ICD-10-CM

## 2021-03-11 DIAGNOSIS — Z01.419 WELL WOMAN EXAM WITH ROUTINE GYNECOLOGICAL EXAM: Primary | ICD-10-CM

## 2021-03-11 DIAGNOSIS — Z12.31 VISIT FOR SCREENING MAMMOGRAM: Primary | ICD-10-CM

## 2021-03-11 PROCEDURE — 77067 SCR MAMMO BI INCL CAD: CPT | Performed by: RADIOLOGY

## 2021-03-11 PROCEDURE — 99396 PREV VISIT EST AGE 40-64: CPT | Performed by: OBSTETRICS & GYNECOLOGY

## 2021-03-11 PROCEDURE — 77067 SCR MAMMO BI INCL CAD: CPT | Performed by: OBSTETRICS & GYNECOLOGY

## 2021-03-11 RX ORDER — RIZATRIPTAN BENZOATE 10 MG/1
TABLET ORAL
COMMUNITY
Start: 2021-01-26 | End: 2023-03-17

## 2021-03-11 NOTE — PROGRESS NOTES
Routine Annual Visit    3/11/2021    Patient: Nevin Marie          MR#:[id number]      Chief Complaint   Patient presents with   • Gynecologic Exam     AE AND MG BEFORE EXAM TODAY.  HYST /BSO.  C-SCOPE 2017. AIC is improving.        History of Present Illness    60 y.o. female  who presents for annual exam.   No issues,   Taking calcium and vitamin D, does walk for exercise  Retired, has been isolated and lonely  Has 7 cats and 1 dog  Status post hyst and BSO  CSC 2017  mammo done  Reviewed dexa, recheck in 2 years          No LMP recorded. Patient has had a hysterectomy.  Obstetric History:  OB History        0    Para   0    Term   0       0    AB   0    Living   0       SAB   0    TAB   0    Ectopic   0    Molar        Multiple   0    Live Births                   Menstrual History:     No LMP recorded. Patient has had a hysterectomy.       Sexual History:       ________________________________________  Patient Active Problem List   Diagnosis   • Family history of breast cancer in sister   • Aberrant circumflex origin from RCA   • Abnormal thyroid blood test   • Asthma   • Chest pain   • Depression   • Diabetes mellitus type 2, controlled (CMS/HCC)   • Diastolic CHF, acute on chronic (CMS/HCC)   • History of pulmonary embolus (PE)   • Hyperlipemia   • Iron deficiency anemia   • LBBB (left bundle branch block)   • Hypertension   • Need for SBE (subacute bacterial endocarditis) prophylaxis   • Nephrolithiasis   • Shortness of breath   • Special screening for malignant neoplasm of colon   • Status post aortic valve replacement with prosthetic valve   • Vitamin D deficiency       Past Medical History:   Diagnosis Date   • Acute colitis    • Allergic rhinitis    • Anemia    • Asthma    • Depression    • Diabetes mellitus (CMS/HCC)    • Disease of thyroid gland    • Endometriosis    • Esophageal reflux    • Fibroids    • GERD (gastroesophageal reflux disease)    • H/O dysthymia    • History of  "IBS    • Hyperlipidemia    • Hypertension    • Infertility, female    • Kidney stones    • Menopause    • Osteopenia    • Pelvic pain    • PTSD (post-traumatic stress disorder)        Past Surgical History:   Procedure Laterality Date   • CHOLECYSTECTOMY     • COLONOSCOPY  2017   • CYSTOSCOPY BLADDER STONE LITHOTRIPSY      with stent placement and basket procedure 2015 & 2018   • HYSTERECTOMY     • LAPAROTOMY SALPINGO OOPHORECTOMY     • MOUTH SURGERY     • MYOMECTOMY     • TONSILLECTOMY         Social History     Tobacco Use   Smoking Status Never Smoker   Smokeless Tobacco Never Used       has a current medication list which includes the following prescription(s): albuterol sulfate hfa, aluminum hydroxide-mag trisilicate, calcium carbonate, carvedilol, cetirizine hcl, escitalopram, fluticasone, homeopathic products, lisinopril, meclizine, metformin, mirabegron er, montelukast, multivitamin with minerals, olopatadine, fish oil, omeprazole, promethazine, pseudoephedrine-guaifenesin, ranolazine, rizatriptan, probiotic, simethicone, simvastatin, symbicort, trazodone, and glucose blood.  ________________________________________    Current contraception: status post hysterectomy  History of abnormal Pap smear: no  Family history of Breast cancer: no  Family history of uterine or ovarian cancer: no  Family History of colon cancer/colon polyps: no  History of abnormal mammogram: no      The following portions of the patient's history were reviewed and updated as appropriate: allergies, current medications, past family history, past medical history, past social history, past surgical history and problem list.    Review of Systems    Pertinent items are noted in HPI.     Objective   Physical Exam    /82   Ht 160 cm (63\")   Wt 79.8 kg (176 lb)   Breastfeeding No   BMI 31.18 kg/m²    BP Readings from Last 3 Encounters:   03/11/21 132/82   12/28/20 122/80   03/03/20 102/60      Wt Readings from Last 3 Encounters: " "  03/11/21 79.8 kg (176 lb)   12/28/20 78.5 kg (173 lb)   03/03/20 79.8 kg (176 lb)      BMI: Estimated body mass index is 31.18 kg/m² as calculated from the following:    Height as of this encounter: 160 cm (63\").    Weight as of this encounter: 79.8 kg (176 lb).      General:   alert, appears stated age and cooperative   Abdomen: soft, non-tender, without masses or organomegaly   Breast: inspection negative, no nipple discharge or bleeding, no masses or nodularity palpable   Vulva: normal   Vagina: normal mucosa   Cervix: absent   Uterus: absent    Adnexa: no mass, fullness, tenderness     Assessment:    1. Normal annual exam   Assessment     ICD-10-CM ICD-9-CM   1. Well woman exam with routine gynecological exam  Z01.419 V72.31   2. Osteopenia, senile  M85.80 733.90     Plan:    Plan     [x]  Mammogram request made  []  PAP done  []  Labs:   []  GC/Chl/TV  []  DEXA scan   []  Referral for colonoscopy:       Diagnoses and all orders for this visit:    1. Well woman exam with routine gynecological exam (Primary)    2. Osteopenia, senile            Counseling:  --Nutrition: Stressed importance of moderation and caloric balance, stressed fresh fruit and vegetables  --Exercise: Stressed the importance of regular exercise. 3-5 times weekly   - Discussed screening mammogram recommendations.   --Discussed benefits of screening colonoscopy- age 45 unless FH  --Discussed pap smear screening recommendations  "

## 2021-12-02 ENCOUNTER — TELEPHONE (OUTPATIENT)
Dept: OBSTETRICS AND GYNECOLOGY | Age: 61
End: 2021-12-02

## 2021-12-02 RX ORDER — VALACYCLOVIR HYDROCHLORIDE 500 MG/1
500 TABLET, FILM COATED ORAL 2 TIMES DAILY
Qty: 10 TABLET | Refills: 5 | Status: SHIPPED | OUTPATIENT
Start: 2021-12-02 | End: 2021-12-07

## 2021-12-02 NOTE — TELEPHONE ENCOUNTER
Spoke with patient. States that she thinks  She is having a herpes outbreak. She would like something sent in for it.

## 2022-03-14 ENCOUNTER — OFFICE VISIT (OUTPATIENT)
Dept: OBSTETRICS AND GYNECOLOGY | Age: 62
End: 2022-03-14

## 2022-03-14 ENCOUNTER — APPOINTMENT (OUTPATIENT)
Dept: WOMENS IMAGING | Facility: HOSPITAL | Age: 62
End: 2022-03-14

## 2022-03-14 ENCOUNTER — PROCEDURE VISIT (OUTPATIENT)
Dept: OBSTETRICS AND GYNECOLOGY | Age: 62
End: 2022-03-14

## 2022-03-14 VITALS
SYSTOLIC BLOOD PRESSURE: 138 MMHG | WEIGHT: 178.2 LBS | HEIGHT: 63 IN | BODY MASS INDEX: 31.57 KG/M2 | DIASTOLIC BLOOD PRESSURE: 82 MMHG

## 2022-03-14 DIAGNOSIS — Z01.419 ENCOUNTER FOR GYNECOLOGICAL EXAMINATION WITHOUT ABNORMAL FINDING: Primary | ICD-10-CM

## 2022-03-14 DIAGNOSIS — B37.2 INTERTRIGINOUS CANDIDIASIS: ICD-10-CM

## 2022-03-14 DIAGNOSIS — Z12.31 VISIT FOR SCREENING MAMMOGRAM: Primary | ICD-10-CM

## 2022-03-14 DIAGNOSIS — R32 URINARY INCONTINENCE, UNSPECIFIED TYPE: ICD-10-CM

## 2022-03-14 LAB
BILIRUB BLD-MCNC: NEGATIVE MG/DL
CLARITY, POC: CLEAR
COLOR UR: YELLOW
GLUCOSE UR STRIP-MCNC: NEGATIVE MG/DL
KETONES UR QL: NEGATIVE
LEUKOCYTE EST, POC: NEGATIVE
NITRITE UR-MCNC: NEGATIVE MG/ML
PH UR: 5.5 [PH] (ref 5–8)
PROT UR STRIP-MCNC: NEGATIVE MG/DL
RBC # UR STRIP: NEGATIVE /UL
SP GR UR: 1.02 (ref 1–1.03)
UROBILINOGEN UR QL: NORMAL

## 2022-03-14 PROCEDURE — 77063 BREAST TOMOSYNTHESIS BI: CPT | Performed by: OBSTETRICS & GYNECOLOGY

## 2022-03-14 PROCEDURE — 77063 BREAST TOMOSYNTHESIS BI: CPT | Performed by: RADIOLOGY

## 2022-03-14 PROCEDURE — 81002 URINALYSIS NONAUTO W/O SCOPE: CPT | Performed by: OBSTETRICS & GYNECOLOGY

## 2022-03-14 PROCEDURE — 77067 SCR MAMMO BI INCL CAD: CPT | Performed by: RADIOLOGY

## 2022-03-14 PROCEDURE — 77067 SCR MAMMO BI INCL CAD: CPT | Performed by: OBSTETRICS & GYNECOLOGY

## 2022-03-14 PROCEDURE — 99396 PREV VISIT EST AGE 40-64: CPT | Performed by: OBSTETRICS & GYNECOLOGY

## 2022-03-14 RX ORDER — SIMVASTATIN 40 MG
1 TABLET ORAL NIGHTLY
COMMUNITY
Start: 2021-12-28 | End: 2022-03-14 | Stop reason: SDUPTHER

## 2022-03-14 RX ORDER — MELATONIN
1000 DAILY
COMMUNITY

## 2022-03-14 RX ORDER — LEVOTHYROXINE SODIUM 0.03 MG/1
25 TABLET ORAL DAILY
COMMUNITY

## 2022-03-14 RX ORDER — LISINOPRIL 2.5 MG/1
2.5 TABLET ORAL DAILY
COMMUNITY
Start: 2022-01-04 | End: 2022-03-14 | Stop reason: SDUPTHER

## 2022-03-14 RX ORDER — OXYBUTYNIN CHLORIDE 15 MG/1
TABLET, EXTENDED RELEASE ORAL
COMMUNITY
Start: 2021-12-09 | End: 2022-03-14

## 2022-03-14 RX ORDER — VALACYCLOVIR HYDROCHLORIDE 500 MG/1
TABLET, FILM COATED ORAL
COMMUNITY
Start: 2022-01-13 | End: 2023-03-17

## 2022-03-14 RX ORDER — OMEGA-3/DHA/EPA/FISH OIL 300-1000MG
CAPSULE ORAL DAILY
COMMUNITY

## 2022-03-14 RX ORDER — CHLORHEXIDINE GLUCONATE 0.12 MG/ML
RINSE ORAL
COMMUNITY
Start: 2022-02-24 | End: 2022-03-14

## 2022-03-14 RX ORDER — NYSTATIN 100000 [USP'U]/G
POWDER TOPICAL
Qty: 60 G | Refills: 1 | Status: SHIPPED | OUTPATIENT
Start: 2022-03-14

## 2022-03-14 RX ORDER — SOLIFENACIN SUCCINATE 5 MG/1
5 TABLET, FILM COATED ORAL DAILY
Qty: 30 TABLET | Refills: 3 | Status: SHIPPED | OUTPATIENT
Start: 2022-03-14 | End: 2022-07-11 | Stop reason: SDUPTHER

## 2022-03-14 RX ORDER — LANOLIN ALCOHOL/MO/W.PET/CERES
5 CREAM (GRAM) TOPICAL DAILY
COMMUNITY
End: 2023-03-17

## 2022-03-14 RX ORDER — FLUCONAZOLE 150 MG/1
TABLET ORAL
Qty: 1 TABLET | Refills: 0 | Status: SHIPPED | OUTPATIENT
Start: 2022-03-14 | End: 2023-03-17

## 2022-03-14 RX ORDER — ASPIRIN 81 MG/1
81 TABLET ORAL DAILY
COMMUNITY

## 2022-03-14 NOTE — PROGRESS NOTES
Routine Annual Visit    3/14/2022    Patient: Nevin Marie          MR#:[id number]      Chief Complaint   Patient presents with   • Gynecologic Exam     Mammo today, Hx of Hyster, Last Mammo 3/11/21, Last C/Scope , last Dexa 10/21/20, C/O rash in pelvic and lower abdominal skin folds, urin incontinence, states last night she had an accident but didn't know she was urinating until after the pad was full.       History of Present Illness    61 y.o. female  who presents for annual exam.     2 concerns,  1. UI- urge, no stress, pt has tried PT in past, has seen urology, tried multiple meds including ditropan and detrol, myrbetriq in past seems to have worked the best  Had episode of urine loss last night, pt does take a sedative type med at night, wears pads daily but only has significant urine loss twice a week    2. Rash in skin folds- sometimes uncomfortable , not too itchy, red    Pap not indicated, hyst  mammo today  dexa due next year  [facility]     Retired, not going well, lonely somewhat, lost 4 cats this year to old age and cancer        No LMP recorded (exact date). Patient has had a hysterectomy.  Obstetric History:  OB History        0    Para   0    Term   0       0    AB   0    Living   0       SAB   0    IAB   0    Ectopic   0    Molar        Multiple   0    Live Births                   Menstrual History:     No LMP recorded (exact date). Patient has had a hysterectomy.       Sexual History:       ________________________________________  Patient Active Problem List   Diagnosis   • Family history of breast cancer in sister   • Aberrant circumflex origin from RCA   • Abnormal thyroid blood test   • Asthma   • Chest pain   • Depression   • Diabetes mellitus type 2, controlled (HCC)   • Diastolic CHF, acute on chronic (HCC)   • History of pulmonary embolus (PE)   • Hyperlipemia   • Iron deficiency anemia   • LBBB (left bundle branch block)   • Hypertension   • Need for SBE (subacute  bacterial endocarditis) prophylaxis   • Nephrolithiasis   • Shortness of breath   • Special screening for malignant neoplasm of colon   • Status post aortic valve replacement with prosthetic valve   • Vitamin D deficiency       Past Medical History:   Diagnosis Date   • Acute colitis    • Allergic rhinitis    • Anemia    • Asthma    • Depression    • Diabetes mellitus (HCC)    • Disease of thyroid gland    • Endometriosis    • Esophageal reflux    • Fibroids    • GERD (gastroesophageal reflux disease)    • H/O dysthymia    • History of IBS    • Hyperlipidemia    • Hypertension    • Infertility, female    • Kidney stones    • Menopause    • Osteopenia    • Pelvic pain    • PTSD (post-traumatic stress disorder)        Past Surgical History:   Procedure Laterality Date   • CHOLECYSTECTOMY     • COLONOSCOPY  2017   • CYSTOSCOPY BLADDER STONE LITHOTRIPSY      with stent placement and basket procedure 2015 & 2018   • HYSTERECTOMY     • LAPAROTOMY SALPINGO OOPHORECTOMY     • MOUTH SURGERY     • MOUTH SURGERY     • MYOMECTOMY     • TONSILLECTOMY         Social History     Tobacco Use   Smoking Status Never Smoker   Smokeless Tobacco Never Used       has a current medication list which includes the following prescription(s): acetaminophen, albuterol sulfate hfa, aspirin, calcium carbonate, carvedilol, cetirizine hcl, cholecalciferol, escitalopram, fish oil-omega-3 fatty acids, fluticasone, glucose blood, homeopathic products, levothyroxine, lisinopril, meclizine, melatonin, metformin, montelukast, multivitamin with minerals, olopatadine, omeprazole, pseudoephedrine-guaifenesin, ranolazine, rimegepant sulfate, rizatriptan, probiotic, simethicone, simvastatin, symbicort, trazodone, valacyclovir, fluconazole, nystatin, promethazine, and solifenacin.  ________________________________________    Current contraception: status post hysterectomy  History of abnormal Pap smear: no  Family history of Breast cancer: no  Family history  "of uterine or ovarian cancer: no  Family History of colon cancer/colon polyps: no  History of abnormal mammogram: no      The following portions of the patient's history were reviewed and updated as appropriate: allergies, current medications, past family history, past medical history, past social history, past surgical history and problem list.    Review of Systems    Pertinent items are noted in HPI.     Objective   Physical Exam    /82   Ht 160 cm (63\")   Wt 80.8 kg (178 lb 3.2 oz)   LMP  (Exact Date)   Breastfeeding No   BMI 31.57 kg/m²    BP Readings from Last 3 Encounters:   03/14/22 138/82   03/11/21 132/82   12/28/20 122/80      Wt Readings from Last 3 Encounters:   03/14/22 80.8 kg (178 lb 3.2 oz)   03/11/21 79.8 kg (176 lb)   12/28/20 78.5 kg (173 lb)      BMI: Estimated body mass index is 31.57 kg/m² as calculated from the following:    Height as of this encounter: 160 cm (63\").    Weight as of this encounter: 80.8 kg (178 lb 3.2 oz).      General:   alert, appears stated age and cooperative   Abdomen: soft, non-tender, without masses or organomegaly   Breast: inspection negative, no nipple discharge or bleeding, no masses or nodularity palpable   Vulva: normal, Bartholin's, Urethra, Carlos's normal   Red rash in groin and pannus fold cw yeast   Vagina: normal mucosa   Cervix: absent   Uterus: absent    Adnexa: no mass, fullness, tenderness     Assessment:    1. Normal annual exam   Assessment     ICD-10-CM ICD-9-CM   1. Encounter for gynecological examination without abnormal finding  Z01.419 V72.31   2. Urinary incontinence, unspecified type  R32 788.30   3. Intertriginous candidiasis  B37.2 112.3     Plan:    Plan     [x]  Mammogram request made  []  PAP done  []  Labs:   []  GC/Chl/TV  []  DEXA scan   []  Referral for colonoscopy:       Diagnoses and all orders for this visit:    1. Encounter for gynecological examination without abnormal finding (Primary)  -     POC Urinalysis Dipstick  -   "   Urine Culture - Urine, Urine, Clean Catch    2. Urinary incontinence, unspecified type  -     POC Urinalysis Dipstick  -     Urine Culture - Urine, Urine, Clean Catch    3. Intertriginous candidiasis    Other orders  -     solifenacin (VESIcare) 5 MG tablet; Take 1 tablet by mouth Daily.  Dispense: 30 tablet; Refill: 3  -     fluconazole (Diflucan) 150 MG tablet; Take one orally , may repeat in 1 week if needed  Dispense: 1 tablet; Refill: 0  -     nystatin (MYCOSTATIN) 047493 UNIT/GM powder; Apply topically in skin folds twice daily as needed  Dispense: 60 g; Refill: 1      Derm referral , pt may want skin check , fair skin tone   Try vesicare, if no luck may need to retry myrbetriq  Follow up with urology for further management      Counseling:  --Nutrition: Stressed importance of moderation and caloric balance, stressed fresh fruit and vegetables  --Exercise: Stressed the importance of regular exercise. 3-5 times weekly   - Discussed screening mammogram recommendations.   --Discussed benefits of screening colonoscopy- age 45 unless FH  --Discussed pap smear screening recommendations

## 2022-03-16 LAB
BACTERIA UR CULT: NO GROWTH
BACTERIA UR CULT: NORMAL

## 2022-07-11 RX ORDER — SOLIFENACIN SUCCINATE 5 MG/1
5 TABLET, FILM COATED ORAL DAILY
Qty: 30 TABLET | Refills: 3 | Status: SHIPPED | OUTPATIENT
Start: 2022-07-11 | End: 2022-11-16 | Stop reason: SDUPTHER

## 2022-11-17 RX ORDER — SOLIFENACIN SUCCINATE 5 MG/1
5 TABLET, FILM COATED ORAL DAILY
Qty: 30 TABLET | Refills: 3 | Status: SHIPPED | OUTPATIENT
Start: 2022-11-17 | End: 2023-02-27

## 2023-02-27 RX ORDER — SOLIFENACIN SUCCINATE 5 MG/1
TABLET, FILM COATED ORAL
Qty: 30 TABLET | Refills: 3 | Status: SHIPPED | OUTPATIENT
Start: 2023-02-27

## 2023-03-17 ENCOUNTER — PROCEDURE VISIT (OUTPATIENT)
Dept: OBSTETRICS AND GYNECOLOGY | Age: 63
End: 2023-03-17
Payer: COMMERCIAL

## 2023-03-17 ENCOUNTER — OFFICE VISIT (OUTPATIENT)
Dept: OBSTETRICS AND GYNECOLOGY | Age: 63
End: 2023-03-17
Payer: COMMERCIAL

## 2023-03-17 VITALS
WEIGHT: 164 LBS | HEIGHT: 63 IN | BODY MASS INDEX: 29.06 KG/M2 | SYSTOLIC BLOOD PRESSURE: 124 MMHG | DIASTOLIC BLOOD PRESSURE: 78 MMHG

## 2023-03-17 DIAGNOSIS — Z12.11 SCREEN FOR COLON CANCER: ICD-10-CM

## 2023-03-17 DIAGNOSIS — Z83.71 FAMILY HISTORY OF COLONIC POLYPS: ICD-10-CM

## 2023-03-17 DIAGNOSIS — Z01.419 ENCOUNTER FOR GYNECOLOGICAL EXAMINATION WITHOUT ABNORMAL FINDING: Primary | ICD-10-CM

## 2023-03-17 DIAGNOSIS — Z12.31 VISIT FOR SCREENING MAMMOGRAM: Primary | ICD-10-CM

## 2023-03-17 PROCEDURE — 99396 PREV VISIT EST AGE 40-64: CPT | Performed by: OBSTETRICS & GYNECOLOGY

## 2023-03-17 PROCEDURE — 77063 BREAST TOMOSYNTHESIS BI: CPT | Performed by: OBSTETRICS & GYNECOLOGY

## 2023-03-17 PROCEDURE — 77067 SCR MAMMO BI INCL CAD: CPT | Performed by: OBSTETRICS & GYNECOLOGY
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
## 2023-04-27 ENCOUNTER — PROCEDURE VISIT (OUTPATIENT)
Dept: OBSTETRICS AND GYNECOLOGY | Age: 63
End: 2023-04-27
Payer: COMMERCIAL

## 2023-04-27 DIAGNOSIS — Z78.0 POST-MENOPAUSAL: Primary | ICD-10-CM

## 2023-04-28 ENCOUNTER — TELEPHONE (OUTPATIENT)
Dept: OBSTETRICS AND GYNECOLOGY | Age: 63
End: 2023-04-28
Payer: COMMERCIAL

## 2023-04-28 RX ORDER — ALENDRONATE SODIUM 70 MG/1
70 TABLET ORAL
Qty: 12 TABLET | Refills: 3 | Status: SHIPPED | OUTPATIENT
Start: 2023-04-28

## 2023-04-28 NOTE — TELEPHONE ENCOUNTER
Spoke with pt  Osteoporosis of femur neck, areas of moderate osteopenia in spine  Recommend start fosamax 70 mg weekly  Plan recheck 2 years dexa

## 2023-08-09 RX ORDER — SOLIFENACIN SUCCINATE 5 MG/1
5 TABLET, FILM COATED ORAL DAILY
Qty: 90 TABLET | Refills: 3 | Status: SHIPPED | OUTPATIENT
Start: 2023-08-09

## 2024-03-21 ENCOUNTER — HOSPITAL ENCOUNTER (OUTPATIENT)
Facility: HOSPITAL | Age: 64
Discharge: HOME | End: 2024-03-21
Admitting: OBSTETRICS & GYNECOLOGY
Payer: COMMERCIAL

## 2024-03-21 ENCOUNTER — OFFICE VISIT (OUTPATIENT)
Dept: OBSTETRICS AND GYNECOLOGY | Age: 64
End: 2024-03-21
Payer: COMMERCIAL

## 2024-03-21 VITALS
SYSTOLIC BLOOD PRESSURE: 110 MMHG | WEIGHT: 162 LBS | DIASTOLIC BLOOD PRESSURE: 72 MMHG | BODY MASS INDEX: 28.7 KG/M2 | HEIGHT: 63 IN

## 2024-03-21 DIAGNOSIS — Z12.31 VISIT FOR SCREENING MAMMOGRAM: ICD-10-CM

## 2024-03-21 DIAGNOSIS — Z01.419 ENCOUNTER FOR GYNECOLOGICAL EXAMINATION WITHOUT ABNORMAL FINDING: Primary | ICD-10-CM

## 2024-03-21 DIAGNOSIS — Z78.0 POST-MENOPAUSAL: ICD-10-CM

## 2024-03-21 DIAGNOSIS — M81.0 AGE-RELATED OSTEOPOROSIS WITHOUT CURRENT PATHOLOGICAL FRACTURE: ICD-10-CM

## 2024-03-21 DIAGNOSIS — Z12.31 SCREENING MAMMOGRAM FOR BREAST CANCER: ICD-10-CM

## 2024-03-21 PROCEDURE — 77067 SCR MAMMO BI INCL CAD: CPT

## 2024-03-21 PROCEDURE — 77063 BREAST TOMOSYNTHESIS BI: CPT

## 2024-03-21 RX ORDER — ALENDRONATE SODIUM 70 MG/1
70 TABLET ORAL
Qty: 12 TABLET | Refills: 3 | Status: SHIPPED | OUTPATIENT
Start: 2024-03-21

## 2024-03-21 NOTE — PROGRESS NOTES
Routine Annual Visit    3/21/2024    Patient: Nevin Marie          MR#:[id number]      Chief Complaint   Patient presents with    Gynecologic Exam     Annual Exam - last AE 3/17/23, Hx of Hyst, mammo today, dexa 23, colonoscopy scheduled in April, Pt c/o bump on vulva that has been there x's 1 month or so, Pt stating bump is not bothersome but sometimes sensitive to the touch, Pt also c/o spontaneous Rt sided pelvic/groin pain        History of Present Illness    63 y.o. female  who presents for annual exam.     Patient is feeling okay today  She has had some right pelvic groin pain at times  She has had a workup for this  She does not have any gynecological organs  She also feels a vulvar bump  It sounds like it is right at the introitus  We will look at this on exam  She is taking Fosamax for osteoporosis  She will be due for DEXA scan next year  She has had a hysterectomy so Pap is not indicated  Mammogram is done today  Colonoscopy is scheduled next month      No LMP recorded. Patient has had a hysterectomy.  Obstetric History:  OB History          0    Para   0    Term   0       0    AB   0    Living   0         SAB   0    IAB   0    Ectopic   0    Molar        Multiple   0    Live Births                   Menstrual History:     No LMP recorded. Patient has had a hysterectomy.       Sexual History:       ________________________________________  Patient Active Problem List   Diagnosis    Family history of breast cancer in sister    Aberrant circumflex origin from RCA    Abnormal thyroid blood test    Asthma    Chest pain    Depression    Diabetes mellitus type 2, controlled    Diastolic CHF, acute on chronic    History of pulmonary embolus (PE)    Hyperlipemia    Iron deficiency anemia    LBBB (left bundle branch block)    Hypertension    Need for SBE (subacute bacterial endocarditis) prophylaxis    Nephrolithiasis    Shortness of breath    Special screening for malignant neoplasm  of colon    Status post aortic valve replacement with prosthetic valve    Vitamin D deficiency       Past Medical History:   Diagnosis Date    Acute colitis     Allergic rhinitis     Anemia     Asthma     Depression     Diabetes mellitus     Disease of thyroid gland     Endometriosis     Esophageal reflux     Fibroids     GERD (gastroesophageal reflux disease)     H/O dysthymia     History of IBS     Hyperlipidemia     Hypertension     Infertility, female     Kidney stones     Menopause     Osteopenia     Pelvic pain     PTSD (post-traumatic stress disorder)     Pulmonary embolism 05/2017    Caught before open heart surgery; was told to discontinue estrogen patch use       Past Surgical History:   Procedure Laterality Date    CARDIAC CATHETERIZATION  05/2017    CARDIAC VALVE REPLACEMENT  06/2017    Open heart surgery for severely stenosed bicuspid aortic valve; replaced with bioprosthetic valve    CATARACT EXTRACTION  05/2022    Bilateral cateracts removed    CHOLECYSTECTOMY      COLONOSCOPY  2017    CYSTOSCOPY BLADDER STONE LITHOTRIPSY      with stent placement and basket procedure 2015 & 2018    HYSTERECTOMY      LAPAROSCOPIC CHOLECYSTECTOMY  07/2009    LAPAROTOMY SALPINGO OOPHORECTOMY      MOUTH SURGERY      MOUTH SURGERY      MYOMECTOMY      MYOMECTOMY ABDOMINAL APPROACH  08/1996    OOPHORECTOMY  04/2006    With hysterectomy    OVARIAN CYST SURGERY  08/1996    Myomectomy    TONSILLECTOMY      TOTAL ABDOMINAL HYSTERECTOMY WITH SALPINGO OOPHORECTOMY  04/2006    & removal of fallopian tubes    WISDOM TOOTH EXTRACTION  ~1988?       Social History     Tobacco Use   Smoking Status Never   Smokeless Tobacco Never   Tobacco Comments    Exposed to 2nd hand pipe & cigar smoke of father while living with parents       has a current medication list which includes the following prescription(s): acetaminophen, albuterol sulfate hfa, alendronate, aspirin, calcium carbonate, cetirizine hcl, cholecalciferol, escitalopram, fish  "oil-omega-3 fatty acids, fluticasone, glucose blood, homeopathic products, levothyroxine, lisinopril, meclizine, metformin, montelukast, multivitamin with minerals, nystatin, olopatadine, omeprazole, pseudoephedrine-guaifenesin, ranolazine, probiotic, simethicone, simvastatin, solifenacin, symbicort, trazodone, and ubrogepant.  ________________________________________    Current contraception: status post hysterectomy  History of abnormal Pap smear: no  Family history of Breast cancer: sister  Family history of uterine or ovarian cancer: no  Family History of colon cancer/colon polyps: no  History of abnormal mammogram: no      The following portions of the patient's history were reviewed and updated as appropriate: allergies, current medications, past family history, past medical history, past social history, past surgical history, and problem list.    Review of Systems    Pertinent items are noted in HPI.     Objective   Physical Exam    /72   Ht 160 cm (63\")   Wt 73.5 kg (162 lb)   BMI 28.70 kg/m²    BP Readings from Last 3 Encounters:   03/21/24 110/72   03/17/23 124/78   03/14/22 138/82      Wt Readings from Last 3 Encounters:   03/21/24 73.5 kg (162 lb)   03/17/23 74.4 kg (164 lb)   03/14/22 80.8 kg (178 lb 3.2 oz)      BMI: Estimated body mass index is 28.7 kg/m² as calculated from the following:    Height as of this encounter: 160 cm (63\").    Weight as of this encounter: 73.5 kg (162 lb).      General:   alert, appears stated age, and cooperative   Abdomen: soft, non-tender, without masses or organomegaly   Breast: inspection negative, no nipple discharge or bleeding, no masses or nodularity palpable   Vulva: normal, Bartholin's, Urethra, Corona's normal, area of concern looks normal    Vagina: normal mucosa   Cervix: absent   Uterus: absent    Adnexa: no mass, fullness, tenderness     Assessment:    1. Normal annual exam   Assessment     ICD-10-CM ICD-9-CM   1. Encounter for gynecological " examination without abnormal finding  Z01.419 V72.31   2. Post-menopausal  Z78.0 V49.81   3. Age-related osteoporosis without current pathological fracture  M81.0 733.01   4. Screening mammogram for breast cancer  Z12.31 V76.12     Plan:    Plan     [x]  Mammogram request made  []  PAP done  []  Labs:   []  GC/Chl/TV  []  DEXA scan   []  Referral for colonoscopy:       Diagnoses and all orders for this visit:    1. Encounter for gynecological examination without abnormal finding (Primary)    2. Post-menopausal    3. Age-related osteoporosis without current pathological fracture    4. Screening mammogram for breast cancer  -     Mammo Screening Digital Tomosynthesis Bilateral With CAD; Future    Other orders  -     alendronate (Fosamax) 70 MG tablet; Take 1 tablet by mouth Every 7 (Seven) Days.  Dispense: 12 tablet; Refill: 3      Patient will need a DEXA next year  Continue Fosamax      Counseling:  --Nutrition: Stressed importance of moderation and caloric balance, stressed fresh fruit and vegetables  --Exercise: Stressed the importance of regular exercise. 3-5 times weekly   - Discussed screening mammogram recommendations.   --Discussed benefits of screening colonoscopy- age 45 unless FH  --Discussed pap smear screening recommendations

## 2024-08-14 RX ORDER — SOLIFENACIN SUCCINATE 5 MG/1
5 TABLET, FILM COATED ORAL DAILY
Qty: 90 TABLET | Refills: 3 | Status: SHIPPED | OUTPATIENT
Start: 2024-08-14

## 2025-03-10 RX ORDER — ALENDRONATE SODIUM 70 MG/1
70 TABLET ORAL
Qty: 12 TABLET | Refills: 3 | Status: SHIPPED | OUTPATIENT
Start: 2025-03-10

## 2025-03-25 ENCOUNTER — OFFICE VISIT (OUTPATIENT)
Dept: WOUND CARE | Facility: HOSPITAL | Age: 65
End: 2025-03-25
Payer: COMMERCIAL

## 2025-03-25 ENCOUNTER — TELEPHONE (OUTPATIENT)
Dept: OBSTETRICS AND GYNECOLOGY | Age: 65
End: 2025-03-25

## 2025-03-25 NOTE — TELEPHONE ENCOUNTER
Caller: Nevin Marie    Relationship to patient: Self    Best call back number: [phone] (home)       Chief complaint: NEED TO RESCHEDULE APPT    Type of visit: MAMMOGRAM    Requested date: 5/6/25     If rescheduling, when is the original appointment: 3/27/25     Additional notes:PT IS SCHEDULED ON 5/6/25 AT 11/:15 FOR HER ANNUAL VISIT

## 2025-04-01 ENCOUNTER — OFFICE VISIT (OUTPATIENT)
Dept: WOUND CARE | Facility: HOSPITAL | Age: 65
End: 2025-04-01
Payer: COMMERCIAL

## 2025-04-01 PROCEDURE — G0463 HOSPITAL OUTPT CLINIC VISIT: HCPCS

## 2025-05-06 ENCOUNTER — OFFICE VISIT (OUTPATIENT)
Dept: OBSTETRICS AND GYNECOLOGY | Age: 65
End: 2025-05-06
Payer: COMMERCIAL

## 2025-05-06 VITALS
DIASTOLIC BLOOD PRESSURE: 74 MMHG | WEIGHT: 158 LBS | SYSTOLIC BLOOD PRESSURE: 132 MMHG | HEIGHT: 63 IN | BODY MASS INDEX: 28 KG/M2

## 2025-05-06 DIAGNOSIS — Z12.31 SCREENING MAMMOGRAM FOR BREAST CANCER: Primary | ICD-10-CM

## 2025-05-06 DIAGNOSIS — B37.31 YEAST VAGINITIS: ICD-10-CM

## 2025-05-06 DIAGNOSIS — Z01.419 ENCOUNTER FOR GYNECOLOGICAL EXAMINATION WITHOUT ABNORMAL FINDING: Primary | ICD-10-CM

## 2025-05-06 DIAGNOSIS — M81.0 AGE-RELATED OSTEOPOROSIS WITHOUT CURRENT PATHOLOGICAL FRACTURE: ICD-10-CM

## 2025-05-06 PROCEDURE — 99396 PREV VISIT EST AGE 40-64: CPT | Performed by: OBSTETRICS & GYNECOLOGY

## 2025-05-06 RX ORDER — GALCANEZUMAB 120 MG/ML
120 INJECTION, SOLUTION SUBCUTANEOUS
COMMUNITY
Start: 2025-02-26

## 2025-05-06 RX ORDER — DOXYCYCLINE 100 MG/1
CAPSULE ORAL
COMMUNITY
Start: 2025-03-21 | End: 2025-05-06

## 2025-05-06 RX ORDER — MUPIROCIN 20 MG/G
1 OINTMENT TOPICAL 3 TIMES DAILY
COMMUNITY
Start: 2025-03-21

## 2025-05-06 RX ORDER — FLUCONAZOLE 150 MG/1
TABLET ORAL
Qty: 2 TABLET | Refills: 1 | Status: SHIPPED | OUTPATIENT
Start: 2025-05-06

## 2025-05-06 RX ORDER — FERROUS SULFATE 325(65) MG
325 TABLET ORAL DAILY
COMMUNITY

## 2025-05-06 RX ORDER — ALENDRONATE SODIUM 70 MG/1
70 TABLET ORAL
Qty: 12 TABLET | Refills: 3 | Status: SHIPPED | OUTPATIENT
Start: 2025-05-06
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
## 2025-05-08 ENCOUNTER — RESULTS FOLLOW-UP (OUTPATIENT)
Dept: OBSTETRICS AND GYNECOLOGY | Age: 65
End: 2025-05-08
Payer: COMMERCIAL

## 2025-05-08 DIAGNOSIS — M81.0 AGE-RELATED OSTEOPOROSIS WITHOUT CURRENT PATHOLOGICAL FRACTURE: Primary | ICD-10-CM

## 2025-05-08 LAB
A VAGINAE DNA VAG QL NAA+PROBE: NORMAL SCORE
BVAB2 DNA VAG QL NAA+PROBE: NORMAL SCORE
C ALBICANS DNA VAG QL NAA+PROBE: NEGATIVE
C GLABRATA DNA VAG QL NAA+PROBE: NEGATIVE
MEGA1 DNA VAG QL NAA+PROBE: NORMAL SCORE

## 2025-05-14 ENCOUNTER — HOSPITAL ENCOUNTER (OUTPATIENT)
Facility: HOSPITAL | Age: 65
Discharge: HOME | End: 2025-05-14
Admitting: OBSTETRICS & GYNECOLOGY
Payer: COMMERCIAL

## 2025-05-14 DIAGNOSIS — M81.0 AGE-RELATED OSTEOPOROSIS WITHOUT CURRENT PATHOLOGICAL FRACTURE: ICD-10-CM

## 2025-05-14 PROCEDURE — 77080 DXA BONE DENSITY AXIAL: CPT

## 2025-05-20 ENCOUNTER — HOSPITAL ENCOUNTER (OUTPATIENT)
Facility: HOSPITAL | Age: 65
Discharge: HOME | End: 2025-05-20
Admitting: OBSTETRICS & GYNECOLOGY
Payer: COMMERCIAL

## 2025-05-20 DIAGNOSIS — Z12.31 SCREENING MAMMOGRAM FOR BREAST CANCER: ICD-10-CM

## 2025-05-20 PROCEDURE — 77063 BREAST TOMOSYNTHESIS BI: CPT

## 2025-05-20 PROCEDURE — 77067 SCR MAMMO BI INCL CAD: CPT

## 2025-06-02 ENCOUNTER — TELEPHONE (OUTPATIENT)
Dept: OBSTETRICS AND GYNECOLOGY | Age: 65
End: 2025-06-02
Payer: COMMERCIAL

## 2025-06-02 NOTE — TELEPHONE ENCOUNTER
PROVIDER:  DR ZHOU    Patient:  GAGE     Phone#: [phone]    REASON FOR THE CALL: [facility] CALLING TO SEE IF THEY CAN GET DEXA SCAN RECORDS FROM 2023 AND 2020 REQUEST PER DR SADLER//FAX # [phone]     Mother/Father